# Patient Record
Sex: FEMALE | Employment: UNEMPLOYED | URBAN - METROPOLITAN AREA
[De-identification: names, ages, dates, MRNs, and addresses within clinical notes are randomized per-mention and may not be internally consistent; named-entity substitution may affect disease eponyms.]

---
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
## 2025-04-22 ENCOUNTER — OFFICE VISIT (OUTPATIENT)
Dept: PEDIATRICS | Facility: CLINIC | Age: 20
End: 2025-04-22
Payer: COMMERCIAL

## 2025-04-22 VITALS
OXYGEN SATURATION: 99 % | HEART RATE: 74 BPM | WEIGHT: 148.2 LBS | BODY MASS INDEX: 25.3 KG/M2 | HEIGHT: 64 IN | TEMPERATURE: 98.8 F

## 2025-04-22 DIAGNOSIS — Z30.9 ENCOUNTER FOR CONTRACEPTIVE MANAGEMENT, UNSPECIFIED TYPE: ICD-10-CM

## 2025-04-22 DIAGNOSIS — R09.82 POST-NASAL DRIP: ICD-10-CM

## 2025-04-22 DIAGNOSIS — F43.23 ADJUSTMENT REACTION WITH ANXIETY AND DEPRESSION: ICD-10-CM

## 2025-04-22 DIAGNOSIS — J01.90 ACUTE SINUSITIS, RECURRENCE NOT SPECIFIED, UNSPECIFIED LOCATION: Primary | ICD-10-CM

## 2025-04-22 DIAGNOSIS — R06.2 WHEEZING: ICD-10-CM

## 2025-04-22 LAB — PREGNANCY TEST URINE, POC: NEGATIVE

## 2025-04-22 PROCEDURE — 3008F BODY MASS INDEX DOCD: CPT | Performed by: PEDIATRICS

## 2025-04-22 PROCEDURE — 81025 URINE PREGNANCY TEST: CPT | Performed by: PEDIATRICS

## 2025-04-22 PROCEDURE — 99214 OFFICE O/P EST MOD 30 MIN: CPT | Performed by: PEDIATRICS

## 2025-04-22 PROCEDURE — 1036F TOBACCO NON-USER: CPT | Performed by: PEDIATRICS

## 2025-04-22 RX ORDER — FLUTICASONE PROPIONATE 50 MCG
2 SPRAY, SUSPENSION (ML) NASAL DAILY
Qty: 16 G | Refills: 5 | Status: SHIPPED | OUTPATIENT
Start: 2025-04-22 | End: 2026-04-22

## 2025-04-22 RX ORDER — AMOXICILLIN AND CLAVULANATE POTASSIUM 875; 125 MG/1; MG/1
875 TABLET, FILM COATED ORAL 2 TIMES DAILY
Qty: 20 TABLET | Refills: 0 | Status: SHIPPED | OUTPATIENT
Start: 2025-04-22 | End: 2025-05-02

## 2025-04-22 RX ORDER — ALBUTEROL SULFATE 90 UG/1
2 INHALANT RESPIRATORY (INHALATION) EVERY 4 HOURS PRN
Qty: 18 G | Refills: 2 | Status: SHIPPED | OUTPATIENT
Start: 2025-04-22

## 2025-04-22 RX ORDER — CETIRIZINE HYDROCHLORIDE 10 MG/1
10 TABLET ORAL DAILY
Qty: 30 TABLET | Refills: 5 | Status: SHIPPED | OUTPATIENT
Start: 2025-04-22 | End: 2025-10-19

## 2025-04-22 RX ORDER — NORGESTIMATE AND ETHINYL ESTRADIOL 0.25-0.035
1 KIT ORAL DAILY
Qty: 28 TABLET | Refills: 12 | Status: SHIPPED | OUTPATIENT
Start: 2025-04-22 | End: 2026-04-22

## 2025-04-22 ASSESSMENT — ENCOUNTER SYMPTOMS
SORE THROAT: 0
RHINORRHEA: 1
COUGH: 1
FEVER: 0
MYALGIAS: 1
ABDOMINAL PAIN: 0
FATIGUE: 1
EYE DISCHARGE: 0
DIARRHEA: 0
CHEST TIGHTNESS: 0
SHORTNESS OF BREATH: 1
NAUSEA: 0
APPETITE CHANGE: 0
EYE REDNESS: 0
HEADACHES: 1
VOMITING: 0

## 2025-04-22 NOTE — PROGRESS NOTES
"Subjective   Patient ID: Marian Reyes is a 19 y.o. female with history of anxiety and depression  who presents for Cough (Cough, shortness of breath ).    HPI:  Marian presents with complaints of post nasal drainage and rhinorrhea with cough and dyspnea for the past few weeks.   She is unable to go up a flight of stairs without getting winded.  She used an albuterol inhaler in the past which helped.  No fever or sore throat.  She has had fatigue and has difficulty getting out of bed in the morning, but she also has a hard time falling asleep.  She has missed classes and has lack of motivation and focus, and her grades are decreasing.   She tried taking the Zoloft which helped somewhat with anxiety and depression, but did not help with her motivation and focus.      She stopped taking her OCP due to irregular bleeding.  She is sexually active with one partner, and is not using condoms.   She has a history of PCOS.     She would like to start a different birth control.                    Review of Systems   Constitutional:  Positive for fatigue. Negative for appetite change and fever.   HENT:  Positive for congestion, postnasal drip and rhinorrhea. Negative for ear pain and sore throat.    Eyes:  Negative for discharge and redness.   Respiratory:  Positive for cough and shortness of breath. Negative for chest tightness.    Gastrointestinal:  Negative for abdominal pain, diarrhea, nausea and vomiting.   Musculoskeletal:  Positive for myalgias.   Skin:  Negative for rash.   Neurological:  Positive for headaches.       Objective   Pulse 74   Temp 37.1 °C (98.8 °F)   Ht 1.613 m (5' 3.5\")   Wt 67.2 kg (148 lb 3.2 oz)   SpO2 99%   BMI 25.84 kg/m²   BSA: 1.74 meters squared  Growth percentiles: 38 %ile (Z= -0.32) based on CDC (Girls, 2-20 Years) Stature-for-age data based on Stature recorded on 4/22/2025. 78 %ile (Z= 0.78) based on CDC (Girls, 2-20 Years) weight-for-age data using data from 4/22/2025.     Physical " Exam  Vitals reviewed.   Constitutional:       Appearance: Normal appearance.   HENT:      Head: Normocephalic.      Right Ear: Tympanic membrane normal.      Left Ear: Tympanic membrane normal.      Nose: Congestion present.      Mouth/Throat:      Mouth: Mucous membranes are moist.      Pharynx: Oropharynx is clear. No posterior oropharyngeal erythema.   Eyes:      Extraocular Movements: Extraocular movements intact.      Conjunctiva/sclera: Conjunctivae normal.      Pupils: Pupils are equal, round, and reactive to light.   Cardiovascular:      Rate and Rhythm: Normal rate and regular rhythm.      Heart sounds: No murmur heard.  Pulmonary:      Effort: Pulmonary effort is normal.      Breath sounds: Normal breath sounds. No wheezing, rhonchi or rales.   Abdominal:      General: Abdomen is flat.      Palpations: Abdomen is soft.      Tenderness: There is no abdominal tenderness. There is no guarding.   Musculoskeletal:      Cervical back: Normal range of motion and neck supple.      Right lower leg: No edema.      Left lower leg: No edema.   Skin:     General: Skin is warm and dry.   Neurological:      General: No focal deficit present.      Mental Status: She is alert.   Psychiatric:         Mood and Affect: Mood normal.         Assessment/Plan   Diagnoses and all orders for this visit:  Acute sinusitis, recurrence not specified, unspecified location  -     amoxicillin-clavulanate (Augmentin) 875-125 mg tablet; Take 1 tablet (875 mg) by mouth 2 times a day for 10 days.  Encounter for contraceptive management, unspecified type  -     POCT pregnancy, urine manually resulted  -     norgestimate-ethinyl estradiol (Ortho-Cyclen) 0.25-0.035 mg tablet; Take 1 tablet by mouth once daily.  Wheezing  -     albuterol 90 mcg/actuation inhaler; Inhale 2 puffs every 4 hours if needed for wheezing. Every 4-6 hours if needed  Post-nasal drip  -     cetirizine (ZyrTEC) 10 mg tablet; Take 1 tablet (10 mg) by mouth once daily.  -      fluticasone (Flonase) 50 mcg/actuation nasal spray; Administer 2 sprays into each nostril once daily. Shake gently. Before first use, prime pump. After use, clean tip and replace cap.  Adjustment reaction with anxiety and depression  Other orders  -     Follow Up In Pediatrics - New Behavioral; Future  Adult ADHD inventory given along with depression and anxiety screens.  Will follow up in 1 week to discuss motivation and focus.

## 2025-04-22 NOTE — LETTER
April 22, 2025     Patient: Marian Reyes   YOB: 2005   Date of Visit: 4/22/2025       To Whom It May Concern:    Marian Reyes was seen in my clinic on 4/22/2025 at 9:40 am. Please excuse Marian for her absence from school on this day to make the appointment.  Marian had an illness from 3/29 to 4/8/25.  Please excuse her for intermittent absences during this time.    If you have any questions or concerns, please don't hesitate to call.         Sincerely,         Yesenia New MD        CC: No Recipients

## 2025-04-29 ENCOUNTER — APPOINTMENT (OUTPATIENT)
Dept: PEDIATRICS | Facility: CLINIC | Age: 20
End: 2025-04-29
Payer: COMMERCIAL

## 2025-04-29 VITALS
DIASTOLIC BLOOD PRESSURE: 68 MMHG | WEIGHT: 150.2 LBS | SYSTOLIC BLOOD PRESSURE: 116 MMHG | BODY MASS INDEX: 25.64 KG/M2 | TEMPERATURE: 98.2 F | HEIGHT: 64 IN

## 2025-04-29 DIAGNOSIS — F43.23 ADJUSTMENT REACTION WITH ANXIETY AND DEPRESSION: Primary | ICD-10-CM

## 2025-04-29 DIAGNOSIS — F90.0 ATTENTION DEFICIT HYPERACTIVITY DISORDER (ADHD), PREDOMINANTLY INATTENTIVE TYPE: ICD-10-CM

## 2025-04-29 PROCEDURE — 1036F TOBACCO NON-USER: CPT | Performed by: PEDIATRICS

## 2025-04-29 PROCEDURE — 3008F BODY MASS INDEX DOCD: CPT | Performed by: PEDIATRICS

## 2025-04-29 PROCEDURE — 99213 OFFICE O/P EST LOW 20 MIN: CPT | Performed by: PEDIATRICS

## 2025-04-29 PROCEDURE — 96127 BRIEF EMOTIONAL/BEHAV ASSMT: CPT | Performed by: PEDIATRICS

## 2025-04-29 RX ORDER — BUPROPION HYDROCHLORIDE 75 MG/1
TABLET ORAL
Qty: 64 TABLET | Refills: 0 | Status: SHIPPED | OUTPATIENT
Start: 2025-04-29 | End: 2025-06-02

## 2025-04-29 NOTE — PROGRESS NOTES
"Subjective   Marian Reyes is a 19 y.o. female who presents for follow up of anxiety depression and ADD symptoms.    Sinus symptoms from last visit have improved. She remains on Zyrtec and Flonase.    Since the last visit:  She continues to struggle with motivation, panic, depression, and attention to her school work.    Adult ADHD self report scale was in the significant range.  She struggles with organization and getting started on and completing work.  She is easily distracted and makes careless mistakes.    PHQ-9 score of 24  SCARED anxiety score of 64    Headache:  Yes   Abdominal pain:  No   Appetite changes:  Yes --she is more hungry since starting the birth control.     Sleep disturbance:  She has difficulty falling asleep, but does not wake through the night.     Panic attacks:  Yes   Motivation:  low  Mood:  little interest/pleasure doing things, feeling down, feeling depressed, feeling hopeless, trouble falling asleep, feeling tired/little energy, low motivation, overeating, feeling bad about self, and trouble concentrating      Alcohol use:  Denies  Other drug use:  Denies  Current suicidal risk:  She has some thoughts that she may be better off dead, but denies any suicidal plan or intent.      Objective   /68   Temp 36.8 °C (98.2 °F)   Ht 1.626 m (5' 4\")   Wt 68.1 kg (150 lb 3.2 oz)   BMI 25.78 kg/m²    Physical Exam  Vitals reviewed.   Constitutional:       Appearance: Normal appearance.   HENT:      Right Ear: Tympanic membrane normal.      Left Ear: Tympanic membrane normal.      Nose: Nose normal.      Mouth/Throat:      Mouth: Mucous membranes are moist.      Pharynx: Oropharynx is clear.   Eyes:      Conjunctiva/sclera: Conjunctivae normal.   Cardiovascular:      Rate and Rhythm: Normal rate and regular rhythm.      Heart sounds: Normal heart sounds.   Pulmonary:      Effort: Pulmonary effort is normal.      Breath sounds: Normal breath sounds.   Musculoskeletal:      Cervical back: " Neck supple.   Neurological:      Mental Status: She is alert.   Psychiatric:         Mood and Affect: Mood normal.         Behavior: Behavior normal.         Thought Content: Thought content normal.         Assessment/Plan   Diagnoses and all orders for this visit:  Adjustment reaction with anxiety and depression  -     buPROPion (Wellbutrin) 75 mg tablet; Take 1 tablet (75 mg) by mouth once daily for 4 days, THEN 1 tablet (75 mg) 2 times a day.  Attention deficit hyperactivity disorder (ADHD), predominantly inattentive type  -     buPROPion (Wellbutrin) 75 mg tablet; Take 1 tablet (75 mg) by mouth once daily for 4 days, THEN 1 tablet (75 mg) 2 times a day.  Other orders  -     Follow Up In Pediatrics - New Behavioral  -     Follow Up In Pediatrics - Established Behavioral; Future  Discussed trial of Wellbutrin as this may help both depression and anxiety symptoms.  If anxiety symptoms are increasing, will call before next appointment.    Follow up in 1 month.

## 2025-04-29 NOTE — PATIENT INSTRUCTIONS
Take the Wellbutrin as directed.  Call with any concerns about side effects.  Try to eat a healthy diet and plan some snacks that aren't processed foods such as apples, bananas, carrots, celery, etc.  Follow up in 1 month.

## 2025-05-30 ENCOUNTER — APPOINTMENT (OUTPATIENT)
Dept: PEDIATRICS | Facility: CLINIC | Age: 20
End: 2025-05-30
Payer: COMMERCIAL